# Patient Record
Sex: MALE | URBAN - METROPOLITAN AREA
[De-identification: names, ages, dates, MRNs, and addresses within clinical notes are randomized per-mention and may not be internally consistent; named-entity substitution may affect disease eponyms.]

---

## 2023-03-01 ENCOUNTER — CLAIMS CREATED FROM THE CLAIM WINDOW (OUTPATIENT)
Dept: URBAN - METROPOLITAN AREA MEDICAL CENTER 23 | Facility: MEDICAL CENTER | Age: 68
End: 2023-03-01
Payer: MEDICARE

## 2023-03-01 ENCOUNTER — OUT OF OFFICE VISIT (OUTPATIENT)
Dept: URBAN - METROPOLITAN AREA MEDICAL CENTER 23 | Facility: MEDICAL CENTER | Age: 68
End: 2023-03-01

## 2023-03-01 DIAGNOSIS — R47.01 ANOMIC APHASIA: ICD-10-CM

## 2023-03-01 DIAGNOSIS — R63.30 FEEDING DIFFICULTIES: ICD-10-CM

## 2023-03-01 PROCEDURE — 99223 1ST HOSP IP/OBS HIGH 75: CPT | Performed by: PHYSICIAN ASSISTANT

## 2023-03-01 PROCEDURE — G8427 DOCREV CUR MEDS BY ELIG CLIN: HCPCS | Performed by: PHYSICIAN ASSISTANT

## 2023-03-02 ENCOUNTER — CLAIMS CREATED FROM THE CLAIM WINDOW (OUTPATIENT)
Dept: URBAN - METROPOLITAN AREA MEDICAL CENTER 23 | Facility: MEDICAL CENTER | Age: 68
End: 2023-03-02

## 2023-03-02 ENCOUNTER — CLAIMS CREATED FROM THE CLAIM WINDOW (OUTPATIENT)
Dept: URBAN - METROPOLITAN AREA MEDICAL CENTER 23 | Facility: MEDICAL CENTER | Age: 68
End: 2023-03-02
Payer: MEDICARE

## 2023-03-02 DIAGNOSIS — R63.30 FEEDING DIFFICULTIES: ICD-10-CM

## 2023-03-02 DIAGNOSIS — R47.01 ANOMIC APHASIA: ICD-10-CM

## 2023-03-02 PROCEDURE — 99232 SBSQ HOSP IP/OBS MODERATE 35: CPT | Performed by: PHYSICIAN ASSISTANT

## 2023-03-06 ENCOUNTER — CLAIMS CREATED FROM THE CLAIM WINDOW (OUTPATIENT)
Dept: URBAN - METROPOLITAN AREA MEDICAL CENTER 23 | Facility: MEDICAL CENTER | Age: 68
End: 2023-03-06

## 2023-03-06 ENCOUNTER — CLAIMS CREATED FROM THE CLAIM WINDOW (OUTPATIENT)
Dept: URBAN - METROPOLITAN AREA MEDICAL CENTER 23 | Facility: MEDICAL CENTER | Age: 68
End: 2023-03-06
Payer: MEDICARE

## 2023-03-06 DIAGNOSIS — R13.19 CERVICAL DYSPHAGIA: ICD-10-CM

## 2023-03-06 DIAGNOSIS — E46 CALORIC MALNUTRITION: ICD-10-CM

## 2023-03-06 PROCEDURE — 43246 EGD PLACE GASTROSTOMY TUBE: CPT | Performed by: INTERNAL MEDICINE

## 2023-03-07 ENCOUNTER — CLAIMS CREATED FROM THE CLAIM WINDOW (OUTPATIENT)
Dept: URBAN - METROPOLITAN AREA MEDICAL CENTER 23 | Facility: MEDICAL CENTER | Age: 68
End: 2023-03-07

## 2023-03-07 ENCOUNTER — CLAIMS CREATED FROM THE CLAIM WINDOW (OUTPATIENT)
Dept: URBAN - METROPOLITAN AREA MEDICAL CENTER 23 | Facility: MEDICAL CENTER | Age: 68
End: 2023-03-07
Payer: MEDICARE

## 2023-03-07 DIAGNOSIS — R13.19 CERVICAL DYSPHAGIA: ICD-10-CM

## 2023-03-07 PROCEDURE — 99232 SBSQ HOSP IP/OBS MODERATE 35: CPT | Performed by: PHYSICIAN ASSISTANT

## 2025-03-17 ENCOUNTER — TELEPHONE ENCOUNTER (OUTPATIENT)
Dept: URBAN - METROPOLITAN AREA CLINIC 6 | Facility: CLINIC | Age: 70
End: 2025-03-17

## 2025-03-19 ENCOUNTER — DASHBOARD ENCOUNTERS (OUTPATIENT)
Age: 70
End: 2025-03-19

## 2025-03-19 ENCOUNTER — OFFICE VISIT (OUTPATIENT)
Dept: URBAN - METROPOLITAN AREA CLINIC 54 | Facility: CLINIC | Age: 70
End: 2025-03-19
Payer: MEDICARE

## 2025-03-19 VITALS
TEMPERATURE: 98.4 F | HEART RATE: 98.4 BPM | SYSTOLIC BLOOD PRESSURE: 127 MMHG | DIASTOLIC BLOOD PRESSURE: 67 MMHG | WEIGHT: 148 LBS

## 2025-03-19 DIAGNOSIS — Z93.1 S/P PERCUTANEOUS ENDOSCOPIC GASTROSTOMY (PEG) TUBE PLACEMENT: ICD-10-CM

## 2025-03-19 PROBLEM — 428653001: Status: ACTIVE | Noted: 2025-03-19

## 2025-03-19 PROCEDURE — 99203 OFFICE O/P NEW LOW 30 MIN: CPT | Performed by: PHYSICIAN ASSISTANT

## 2025-03-19 RX ORDER — AMLODIPINE BESYLATE 5 MG/1
1 TABLET TABLET ORAL ONCE A DAY
Status: ACTIVE | COMMUNITY

## 2025-03-19 RX ORDER — BREXPIPRAZOLE 0.5 MG/1
1 TABLET TABLET ORAL ONCE A DAY
Status: ACTIVE | COMMUNITY

## 2025-03-19 RX ORDER — ATORVASTATIN CALCIUM 40 MG/1
1 TABLET TABLET, FILM COATED ORAL ONCE A DAY
Status: ACTIVE | COMMUNITY

## 2025-03-19 NOTE — HPI-TODAY'S VISIT:
Hollie Potter is a 69-year-old male patient with PMH of HTN, HLD, CVA, who was self-referred to clinic for feeding tube malfunction.  Patient was seen by [facility] at [facility] in 3/2023 for ischemic stroke of the left middle cerebral artery with aphasia. Patient failed swallow study and NG tube was placed and feeds were tolerated.  Patient went PEG tube placement.  Patient's sister (Lore) provides the remaining history via telephone today who states patient can eat some p.o. intake, but does take supplemental nutrition and medications through the PEG tube.  There was concern for PEG tube staining and possible need to replace PEG tube. No known malfunction issues reported.

## 2025-03-19 NOTE — PHYSICAL EXAM GASTROINTESTINAL
Abdomen , soft, nontender, nondistended , PEG tube to LUQ easy to turn. Some discoloration to a segment of the tube, but remains flexible. formula in the tube and minimal leaking from PEG tube site. granulation tissue present at PEG tube site.